# Patient Record
Sex: FEMALE | URBAN - NONMETROPOLITAN AREA
[De-identification: names, ages, dates, MRNs, and addresses within clinical notes are randomized per-mention and may not be internally consistent; named-entity substitution may affect disease eponyms.]

---

## 2023-06-06 ENCOUNTER — OFFICE VISIT (OUTPATIENT)
Dept: URBAN - NONMETROPOLITAN AREA CLINIC 6 | Facility: CLINIC | Age: 88
End: 2023-06-06
Payer: COMMERCIAL

## 2023-06-06 DIAGNOSIS — H43.813 VITREOUS DEGENERATION, BILATERAL: Primary | ICD-10-CM

## 2023-06-06 DIAGNOSIS — H11.442 CONJUNCTIVAL CYST OF LEFT EYE: ICD-10-CM

## 2023-06-06 DIAGNOSIS — Z96.1 PRESENCE OF INTRAOCULAR LENS: ICD-10-CM

## 2023-06-06 DIAGNOSIS — H52.4 PRESBYOPIA: ICD-10-CM

## 2023-06-06 PROCEDURE — 92004 COMPRE OPH EXAM NEW PT 1/>: CPT | Performed by: STUDENT IN AN ORGANIZED HEALTH CARE EDUCATION/TRAINING PROGRAM

## 2023-06-06 ASSESSMENT — INTRAOCULAR PRESSURE
OD: 11
OS: 9

## 2023-06-06 ASSESSMENT — VISUAL ACUITY
OS: 20/30
OD: 20/30

## 2023-06-06 NOTE — IMPRESSION/PLAN
Impression: Conjunctival cyst of left eye: H11.442. Plan: Discussed diagnosis in detail with patient. No treatment is required at this time. Discussed risks and benefits and patient understands. Will continue to observe condition and or symptoms.